# Patient Record
Sex: FEMALE | Race: BLACK OR AFRICAN AMERICAN | NOT HISPANIC OR LATINO | Employment: UNEMPLOYED | URBAN - METROPOLITAN AREA
[De-identification: names, ages, dates, MRNs, and addresses within clinical notes are randomized per-mention and may not be internally consistent; named-entity substitution may affect disease eponyms.]

---

## 2017-01-08 ENCOUNTER — APPOINTMENT (OUTPATIENT)
Dept: CT IMAGING | Facility: CLINIC | Age: 22
End: 2017-01-08
Attending: PHYSICIAN ASSISTANT
Payer: COMMERCIAL

## 2017-01-08 ENCOUNTER — HOSPITAL ENCOUNTER (EMERGENCY)
Facility: CLINIC | Age: 22
Discharge: HOME | End: 2017-01-08
Attending: PHYSICIAN ASSISTANT | Admitting: PHYSICIAN ASSISTANT
Payer: COMMERCIAL

## 2017-01-08 VITALS
SYSTOLIC BLOOD PRESSURE: 108 MMHG | TEMPERATURE: 98.4 F | HEIGHT: 67 IN | OXYGEN SATURATION: 98 % | WEIGHT: 191 LBS | DIASTOLIC BLOOD PRESSURE: 78 MMHG | BODY MASS INDEX: 29.98 KG/M2 | RESPIRATION RATE: 16 BRPM

## 2017-01-08 DIAGNOSIS — S06.0X9A CONCUSSION, WITH LOSS OF CONSCIOUSNESS OF UNSPECIFIED DURATION, INITIAL ENCOUNTER: ICD-10-CM

## 2017-01-08 DIAGNOSIS — S09.90XA CLOSED HEAD INJURY, INITIAL ENCOUNTER: ICD-10-CM

## 2017-01-08 PROCEDURE — 99284 EMERGENCY DEPT VISIT MOD MDM: CPT | Mod: 25

## 2017-01-08 PROCEDURE — 25000132 ZZH RX MED GY IP 250 OP 250 PS 637: Performed by: PHYSICIAN ASSISTANT

## 2017-01-08 PROCEDURE — 70450 CT HEAD/BRAIN W/O DYE: CPT

## 2017-01-08 RX ORDER — ACETAMINOPHEN 500 MG
1000 TABLET ORAL ONCE
Status: COMPLETED | OUTPATIENT
Start: 2017-01-08 | End: 2017-01-08

## 2017-01-08 RX ADMIN — ACETAMINOPHEN 1000 MG: 500 TABLET, FILM COATED ORAL at 20:48

## 2017-01-08 ASSESSMENT — ENCOUNTER SYMPTOMS
NAUSEA: 0
HEADACHES: 1
NUMBNESS: 0
VOMITING: 0

## 2017-01-08 NOTE — ED AVS SNAPSHOT
Emergency Department    [address] [facility] [address]    Phone:  [phone]    Fax:  [phone]                                       Sage Machado   MRN: [id number]    Department:   Emergency Department   Date of Visit:  1/8/2017           Patient Information     Date Of Birth          1995        Your diagnoses for this visit were:     Closed head injury, initial encounter     Concussion, with loss of consciousness of unspecified duration, initial encounter        You were seen by Shelby Felix PA-C.      Follow-up Information     Follow up with  Emergency Department.    Specialty:  EMERGENCY MEDICINE    Why:  If symptoms worsen    Contact information:    [address] [facility] [address] [phone]        Follow up with Yun Norwood MD In 3 days.    Specialty:  Family Practice    Contact information:    [facility]  [address]  [facility] [address] [phone]          Follow up with [facility].    Contact information:    For more information contact:  Concussion hotline: [phone]  To schedule an appointment: [phone]        Discharge Instructions       Discharge Instructions  Head Injury    You have been seen today for a head injury. You were checked for serious problems, like bleeding on the brain, but these problems cannot always be found right away.  Due to this risk, you should not be alone for 24 hours after your injury.  Follow up with your regular physician in 3 days.     Return to the Emergency Department if:    You are confused, have amnesia, or you are not acting right.    Your headache gets worse or you start to have a really bad headache even with your recommended treatment plan.    You vomit more than once.    You have a convulsion or seizure.    You have trouble walking.    You have weakness or paralysis in an arm or a leg.    You have blood or fluid coming from your ears or  nose.    You have new symptoms or anything that worries you.    Sleeping:  It is okay for you to sleep, but someone should wake you up as instructed by your doctor, and someone should check on you at your usual time to wake up.     Activity:    Do not drive for at least 24 hours.    Do not drive if you have dizzy spells or trouble concentrating, or remembering things.    Do not return to any contact sports until cleared by your regular doctor.     Follow-up:  It is very important that you make an appointment with your clinic and go to the appointment.  If you do not follow-up with your regular doctor, it may result in missing an important development which could result in permanent injury or disability and/or lasting pain.  If there is any problem keeping your appointment, call your doctor or return to the Emergency Department.    MORE INFORMATION:    Concussion:  A concussion is a minor head injury that may cause temporary problems with the way your brain works.  Some symptoms include:  confusion, amnesia, nausea and vomiting, dizziness, fatigue, memory or concentration problems, irritability and sleep problems.    CT Scans: Your evaluation today may have included a CT scan (CAT scan) to look for things like bleeding or a skull fracture (break).  CT scans involve radiation and too many CT scans can cause serious health problems like cancer, especially in children.  Because of this, your doctor may not have ordered a CT scan today if they think you are at low risk for a serious or life threatening problem.    If you were given a prescription for medicine here today, be sure to read all of the information (including the package insert) that comes with your prescription.  This will include important information about the medicine, its side effects, and any warnings that you need to know about.  The pharmacist who fills the prescription can provide more information and answer questions you may have about the medicine.   If you have questions or concerns that the pharmacist cannot address, please call or return to the Emergency Department.     Concussions  [facility]  What is a concussion?  A concussion is a mild traumatic brain injury (TBI) that occurs from a direct blow, bump or jolt to the head. It can also result from a blow to another part of the body when the force travels up to the head. A concussion can affect coordination, learning, memory and emotions.  Most concussions heal without issue. However, like any other injury, a brain injury should be given time to heal, which includes physical and mental rest (free from mental straining and visual stimuli like video gary and texting).  Signs and symptoms  Common signs and symptoms may include:    Altered mental status, including confusion, inappropriate emotions, agitation or abrupt change in personality    Amnesia/memory problems    Blurred vision, double vision, seeing stars or black spots    Dizziness, poor balance, or unsteadiness    Excessive or persistent headache    Excessive fatigue    Excessive sensitivity to light or loud noise    Feel  in a fog     Loss of consciousness or orientation    Poor balance/coordination    Ringing in ears    Vacant stare/glassy eyed    Vomiting  One of the most severe problems that can occur is bleeding inside the brain. If bleeding or swelling of the brain occurs, pressure in the skull rises and can cause brain injury. Bleeding may develop right after an injury or hours later, so monitoring symptoms is critical. This can be life threatening.    Why do a baseline computer test (ImPACT)?  Neurocognitive tests, such as ImPACT, provide information about how the brain is responding to injury. ImPACT has two components: a pre- and post-concussion test. The pretest scoring represents one s baseline (normal) brain function.  The test is then repeated post injury. Results of the pre- and post-concussion tests are compared  and a care plan is developed. If a pre-test was not completed prior to a concussion, a post-concussion test is still helpful in the assessment of brain function. An ImPACT test should be completed yearly due to the natural maturing of the brain.    What can I expect from the [facility] concussion program? [facility] providers will:    Facilitate completion of baseline tests    Manage concussion symptoms and make recommendations for return to previous activity level    Facilitate post-concussion testing    Refer to other health-care providers, as needed,  including neuropsychologists, neurologists and therapists who specialize in concussion management    When is it safe to return to activity?  A person should be free of symptoms and have returned  to normal sleeping and eating patterns as well as typical concentration levels before resuming activity. Once normal activities have resumed and there are no symptoms at rest, more demanding activities may be tried. Over time, activity will be increased as long as symptoms do not return. A gradual return to activities allows us the opportunity to assess brain healing.  Under no circumstances should anyone return to activity while experiencing concussion signs or symptoms.    For more information contact:  Concussion hotline: [phone]  To schedule an appointment: [phone]  Cost of computer testing (ImPACT)  Pre-concussion testing - $5  Post-concussion testing:    Option 1: $20 includes all post testing; excludes doctor review.    Option 2: [facility] doctor visit, including all post testing and review.          24 Hour Appointment Hotline       To make an appointment at any [facility] clinic, call [phone] ([phone]). If you don't have a family doctor or clinic, we will help you find one. [facility] clinics are conveniently located to serve the needs of you and your family.          ED Discharge Orders     CONCUSSION  REFERRAL        [facility] is referring you to the Concussion  service at [facility].      The  Representative will assist you in the coordination of your concussion care as prescribed by your physician.    The  Representative will contact you within one business day, or you may contact the  Representative at [phone].    Referral Options:  Non-Sports related concussion management    Coverage of these services are subject to the terms and limitations of your health insurance plan.  Please call member services at your health plan with any benefit or coverage questions.     If X-rays, CT or MRI's have been performed, please contact the facility where they were done, to arrange for  prior to your scheduled appointment.  Please bring this referral request to your appointment and present it to your specialist.                     Review of your medicines      Notice     You have not been prescribed any medications.            Procedures and tests performed during your visit     Head CT w/o contrast      Orders Needing Specimen Collection     None      Pending Results     Date and Time Order Name Status Description    1/8/2017 2043 Head CT w/o contrast Preliminary             Pending Culture Results     No orders found from 1/7/2017 to 1/9/2017.       Test Results from your hospital stay           1/8/2017  9:12 PM - Interface, Radiant Ib      Narrative     CT SCAN OF THE HEAD WITHOUT CONTRAST   1/8/2017 9:01 PM     HISTORY: Head injury with loss of consciousness.     TECHNIQUE:  Axial images of the head and coronal reformations without  IV contrast material.  Radiation dose for this scan was reduced using  automated exposure control, adjustment of the mA and/or kV according  to patient size, or iterative reconstruction technique.    COMPARISON: None.    FINDINGS:  The ventricles are normal in size, shape and configuration.   The brain  parenchyma and subarachnoid spaces are normal. There is no  evidence of intracranial hemorrhage, mass, acute infarct or anomaly.     The visualized portions of the sinuses and mastoids appear normal.  There is no evidence of trauma.        Impression     IMPRESSION: Normal CT scan of the head.                     Clinical Quality Measure: Blood Pressure Screening     Your blood pressure was checked while you were in the emergency department today. The last reading we obtained was  BP: 103/88 mmHg . Please read the guidelines below about what these numbers mean and what you should do about them.  If your systolic blood pressure (the top number) is less than 120 and your diastolic blood pressure (the bottom number) is less than 80, then your blood pressure is normal. There is nothing more that you need to do about it.  If your systolic blood pressure (the top number) is 120-139 or your diastolic blood pressure (the bottom number) is 80-89, your blood pressure may be higher than it should be. You should have your blood pressure rechecked within a year by a primary care provider.  If your systolic blood pressure (the top number) is 140 or greater or your diastolic blood pressure (the bottom number) is 90 or greater, you may have high blood pressure. High blood pressure is treatable, but if left untreated over time it can put you at risk for heart attack, stroke, or kidney failure. You should have your blood pressure rechecked by a primary care provider within the next 4 weeks.  If your provider in the emergency department today gave you specific instructions to follow-up with your doctor or provider even sooner than that, you should follow that instruction and not wait for up to 4 weeks for your follow-up visit.        Thank you for choosing [facility]       Thank you for choosing [facility] for your care. Our goal is always to provide you with excellent care. Hearing back from our patients is one way we can continue to  "improve our services. Please take a few minutes to complete the written survey that you may receive in the mail after you visit with us. Thank you!        [email or web address] Information     [email or web address] lets you send messages to your doctor, view your test results, renew your prescriptions, schedule appointments and more. To sign up, go to [email or web address] . Click on \"Log in\" on the left side of the screen, which will take you to the Welcome page. Then click on \"Sign up Now\" on the right side of the page.     You will be asked to enter the access code listed below, as well as some personal information. Please follow the directions to create your username and password.     Your access code is: [email or web address]  Expires: 2017  9:23 PM     Your access code will  in 90 days. If you need help or a new code, please call your [facility] clinic or [phone].        Care EveryWhere ID     This is your Care EveryWhere ID. This could be used by other organizations to access your [facility] medical records  [id number]        After Visit Summary       This is your record. Keep this with you and show to your community pharmacist(s) and doctor(s) at your next visit.                  "

## 2017-01-08 NOTE — ED AVS SNAPSHOT
Emergency Department    [address] [facility] [address]    Phone:  [phone]    Fax:  [phone]                                       Sgae Machado   MRN: [id number]    Department:   Emergency Department   Date of Visit:  1/8/2017           After Visit Summary Signature Page     I have received my discharge instructions, and my questions have been answered. I have discussed any challenges I see with this plan with the nurse or doctor.    ..........................................................................................................................................  Patient/Patient Representative Signature      ..........................................................................................................................................  Patient Representative Print Name and Relationship to Patient    ..................................................               ................................................  Date                                            Time    ..........................................................................................................................................  Reviewed by Signature/Title    ...................................................              ..............................................  Date                                                            Time

## 2017-01-09 ASSESSMENT — ENCOUNTER SYMPTOMS
ABDOMINAL PAIN: 0
NECK PAIN: 0
BACK PAIN: 0

## 2017-01-09 NOTE — ED NOTES
Bed: ED22  Expected date: 1/8/17  Expected time: 8:29 PM  Means of arrival: Ambulance  Comments:  [facility]-23yo head injury(green)

## 2017-01-09 NOTE — DISCHARGE INSTRUCTIONS
Discharge Instructions  Head Injury    You have been seen today for a head injury. You were checked for serious problems, like bleeding on the brain, but these problems cannot always be found right away.  Due to this risk, you should not be alone for 24 hours after your injury.  Follow up with your regular physician in 3 days.     Return to the Emergency Department if:    You are confused, have amnesia, or you are not acting right.    Your headache gets worse or you start to have a really bad headache even with your recommended treatment plan.    You vomit more than once.    You have a convulsion or seizure.    You have trouble walking.    You have weakness or paralysis in an arm or a leg.    You have blood or fluid coming from your ears or nose.    You have new symptoms or anything that worries you.    Sleeping:  It is okay for you to sleep, but someone should wake you up as instructed by your doctor, and someone should check on you at your usual time to wake up.     Activity:    Do not drive for at least 24 hours.    Do not drive if you have dizzy spells or trouble concentrating, or remembering things.    Do not return to any contact sports until cleared by your regular doctor.     Follow-up:  It is very important that you make an appointment with your clinic and go to the appointment.  If you do not follow-up with your regular doctor, it may result in missing an important development which could result in permanent injury or disability and/or lasting pain.  If there is any problem keeping your appointment, call your doctor or return to the Emergency Department.    MORE INFORMATION:    Concussion:  A concussion is a minor head injury that may cause temporary problems with the way your brain works.  Some symptoms include:  confusion, amnesia, nausea and vomiting, dizziness, fatigue, memory or concentration problems, irritability and sleep problems.    CT Scans: Your evaluation today may have included a CT scan (CAT  scan) to look for things like bleeding or a skull fracture (break).  CT scans involve radiation and too many CT scans can cause serious health problems like cancer, especially in children.  Because of this, your doctor may not have ordered a CT scan today if they think you are at low risk for a serious or life threatening problem.    If you were given a prescription for medicine here today, be sure to read all of the information (including the package insert) that comes with your prescription.  This will include important information about the medicine, its side effects, and any warnings that you need to know about.  The pharmacist who fills the prescription can provide more information and answer questions you may have about the medicine.  If you have questions or concerns that the pharmacist cannot address, please call or return to the Emergency Department.     Concussions  [facility]  What is a concussion?  A concussion is a mild traumatic brain injury (TBI) that occurs from a direct blow, bump or jolt to the head. It can also result from a blow to another part of the body when the force travels up to the head. A concussion can affect coordination, learning, memory and emotions.  Most concussions heal without issue. However, like any other injury, a brain injury should be given time to heal, which includes physical and mental rest (free from mental straining and visual stimuli like video gary and texting).  Signs and symptoms  Common signs and symptoms may include:    Altered mental status, including confusion, inappropriate emotions, agitation or abrupt change in personality    Amnesia/memory problems    Blurred vision, double vision, seeing stars or black spots    Dizziness, poor balance, or unsteadiness    Excessive or persistent headache    Excessive fatigue    Excessive sensitivity to light or loud noise    Feel  in a fog     Loss of consciousness or orientation    Poor  balance/coordination    Ringing in ears    Vacant stare/glassy eyed    Vomiting  One of the most severe problems that can occur is bleeding inside the brain. If bleeding or swelling of the brain occurs, pressure in the skull rises and can cause brain injury. Bleeding may develop right after an injury or hours later, so monitoring symptoms is critical. This can be life threatening.    Why do a baseline computer test (ImPACT)?  Neurocognitive tests, such as ImPACT, provide information about how the brain is responding to injury. ImPACT has two components: a pre- and post-concussion test. The pretest scoring represents one s baseline (normal) brain function.  The test is then repeated post injury. Results of the pre- and post-concussion tests are compared and a care plan is developed. If a pre-test was not completed prior to a concussion, a post-concussion test is still helpful in the assessment of brain function. An ImPACT test should be completed yearly due to the natural maturing of the brain.    What can I expect from the [facility] concussion program? [facility] providers will:    Facilitate completion of baseline tests    Manage concussion symptoms and make recommendations for return to previous activity level    Facilitate post-concussion testing    Refer to other health-care providers, as needed,  including neuropsychologists, neurologists and therapists who specialize in concussion management    When is it safe to return to activity?  A person should be free of symptoms and have returned  to normal sleeping and eating patterns as well as typical concentration levels before resuming activity. Once normal activities have resumed and there are no symptoms at rest, more demanding activities may be tried. Over time, activity will be increased as long as symptoms do not return. A gradual return to activities allows us the opportunity to assess brain healing.  Under no circumstances  should anyone return to activity while experiencing concussion signs or symptoms.    For more information contact:  Concussion hotline: [phone]  To schedule an appointment: [phone]  Cost of computer testing (ImPACT)  Pre-concussion testing - $5  Post-concussion testing:    Option 1: $20 includes all post testing; excludes doctor review.    Option 2: [facility] doctor visit, including all post testing and review.

## 2017-01-09 NOTE — ED PROVIDER NOTES
"  History     Chief Complaint:  Head injury    HPI   Sage Machado is a healthy 22 year old pregnant (~ 11 weeks pregnant) female who presents for evaluation of a head injury. The patient states that last night a DVD player landed on the back of her head. She is not sure if she lost consciousness or not, but states that she felt woozy for ~1 minute. Currently, she admits to headache and intermittent blurry vision. She denies neck pain or any other injuries. She has not taken any medications for pain. She denies nausea and vomiting. She denies any trauma to the abdomen, abdominal pain or vaginal bleeding. She denies anticoagulation.     Allergies:  No known drug allergies.     Medications:    The patient is currently on no regular medications.     Past Medical History:    HSV infection     Past Surgical History:    Induced  by D&C     Family History:    History reviewed. No pertinent family history.    Social History:  Marital Status: single  Presents to the ED with her daughter  Tobacco Use: 0.50 ppd  Alcohol Use: no  PCP: Yun Norwood     Review of Systems   Gastrointestinal: Negative for nausea, vomiting and abdominal pain.   Genitourinary: Negative for vaginal bleeding.   Musculoskeletal: Negative for back pain and neck pain.   Neurological: Positive for headaches. Negative for numbness.   All other systems reviewed and are negative.      Physical Exam     Patient Vitals for the past 24 hrs:   BP Temp Temp src Heart Rate Resp SpO2 Height Weight   17 2125 108/78 mmHg - - 89 16 98 % - -   17 2043 103/88 mmHg 98.4  F (36.9  C) Oral 108 18 99 % 1.702 m (5' 7\") 86.637 kg (191 lb)          Physical Exam  Nursing note and vitals reviewed.     GENERAL: Alert, mild distress.   HEENT:   Head: No facial asymmetry. No scalp hematomas or palpable bony step offs.   Eyes: Normal conjunctiva. No scleral icterus. PERRLA. EOMI. No nystagmus. No racoon's eyes.   Ears: Normal pinnae. Normal external " auditory canals. Normal tympanic membranes. No hemotympanum bilaterally. No Espinoza's signs.   Nose: No deformity. No nasal drainage.   Throat: MMM. No oropharyngeal erythema, edema, or exudate.  NECK: Supple, normal ROM. No midline tenderness over cervical spine.   CARDIAC: Normal rate and regular rhythm. Normal heart sounds. No murmurs, rubs, or gallops appreciated. Intact distal pulses.   PULMONARY: CTA bilaterally. Normal breath sounds. No wheezing, crackles, or rhonchi appreciated.    ABDOMEN: Soft, non-tender, non-distended. No rebound or guarding.   NEURO: Alert and oriented. GCS 15. Normal speech. CN II-XII intact. Sensation intact throughout. Normal strength of bilateral upper and lower extremities.   MUSCULOSKELETAL: Normal range of motion. No peripheral edema. No significant midline tenderness over thoracic, lumbar or sacral spine.   SKIN: Skin is warm and dry. No rashes. No pallor or jaundice.   PSYCH: Normal affect and mood.       Emergency Department Course     Imaging:  Head CT w/o contrast  Normal CT scan of the head.  Preliminary radiology read.      Radiographic findings were communicated with the patient who voiced understanding of the findings.    Interventions:  (2048) Tylenol, 1,000 mg, PO    Emergency Department Course:  Nursing notes and vitals reviewed.  I performed an exam of the patient as documented above. GCS 15.    The patient was sent for a head CT while in the emergency department, findings above.     (2120) I updated the patient on the CT results.     Findings and plan explained to the patient. Patient discharged home with instructions regarding supportive care, medications, and reasons to return. The importance of close follow-up was reviewed.      Impression & Plan      Medical Decision Making:  Sage Machado is a 22 year old female who is approximately 11 weeks pregnant who presents with concern for headache following a head injury yesterday where a DVD fell onto her head. It  sounds like she may have lost consciousness for a brief period. Since that time, she has symptoms of a headache prompting her arrival here. Here she has a completely normal neurological exam, however, given the potential brief loss of consciousness, CT was obtained. It was negative for any acute intracranial hemorrhage or skull fracture. No other acute injuries were noted on exam. Signs and symptoms are consistent with a concussion at this time. I reviewed the importance of brain rest and tylenol for pain. A referral for a concussion program was provided. In regards to her pregnancy, no abdominal pain or vaginal bleeding warranting further work-up. Reviewed reasons to return to ED, including worsening symptoms, focal weakness/numbness, vomiting, or any new concerns. The patient was in agreement with plan and discharged in satisfactory condition with all questions answered.        Diagnosis:    ICD-10-CM    1. Closed head injury, initial encounter S09.90XA CONCUSSION  REFERRAL   2. Concussion, with loss of consciousness of unspecified duration, initial encounter S06.0X9A CONCUSSION  REFERRAL       Disposition:  Discharge to home    INikhil, am serving as a scribe on 1/8/2017 at 8:40 PM to personally document services performed by Shelby Felix PA-C based on my observations and the provider's statements to me.     1/8/2017    EMERGENCY DEPARTMENT        Shelby Felix PA-C  01/09/17 [id number]

## 2017-07-24 ENCOUNTER — OFFICE VISIT (OUTPATIENT)
Dept: FAMILY MEDICINE | Facility: CLINIC | Age: 22
End: 2017-07-24
Payer: COMMERCIAL

## 2017-07-24 VITALS
WEIGHT: 178 LBS | DIASTOLIC BLOOD PRESSURE: 68 MMHG | SYSTOLIC BLOOD PRESSURE: 102 MMHG | TEMPERATURE: 98.8 F | RESPIRATION RATE: 16 BRPM | BODY MASS INDEX: 27.88 KG/M2 | OXYGEN SATURATION: 99 % | HEART RATE: 125 BPM

## 2017-07-24 DIAGNOSIS — R39.9 UTI SYMPTOMS: Primary | ICD-10-CM

## 2017-07-24 DIAGNOSIS — Z32.00 POSSIBLE PREGNANCY, NOT CONFIRMED: ICD-10-CM

## 2017-07-24 LAB
ALBUMIN UR-MCNC: NEGATIVE MG/DL
APPEARANCE UR: CLEAR
BETA HCG QUAL IFA URINE: POSITIVE
BILIRUB UR QL STRIP: NEGATIVE
COLOR UR AUTO: YELLOW
GLUCOSE UR STRIP-MCNC: NEGATIVE MG/DL
HGB UR QL STRIP: NEGATIVE
KETONES UR STRIP-MCNC: ABNORMAL MG/DL
LEUKOCYTE ESTERASE UR QL STRIP: NEGATIVE
Lab: NORMAL
MICRO REPORT STATUS: NORMAL
NITRATE UR QL: NEGATIVE
PH UR STRIP: 7 PH (ref 5–7)
SP GR UR STRIP: 1.02 (ref 1–1.03)
SPECIMEN SOURCE: NORMAL
URN SPEC COLLECT METH UR: ABNORMAL
UROBILINOGEN UR STRIP-ACNC: 0.2 EU/DL (ref 0.2–1)
WET PREP SPEC: NORMAL

## 2017-07-24 PROCEDURE — 84703 CHORIONIC GONADOTROPIN ASSAY: CPT | Performed by: FAMILY MEDICINE

## 2017-07-24 PROCEDURE — 99214 OFFICE O/P EST MOD 30 MIN: CPT | Performed by: FAMILY MEDICINE

## 2017-07-24 PROCEDURE — 81003 URINALYSIS AUTO W/O SCOPE: CPT | Performed by: FAMILY MEDICINE

## 2017-07-24 PROCEDURE — 87210 SMEAR WET MOUNT SALINE/INK: CPT | Performed by: FAMILY MEDICINE

## 2017-07-24 NOTE — MR AVS SNAPSHOT
"              After Visit Summary   2017    Sage Machado    MRN: [id number]           Patient Information     Date Of Birth          1995        Visit Information        Provider Department      2017 4:20 PM Yun Norwood MD [facility]        Today's Diagnoses     UTI symptoms    -  1    Possible pregnancy, not confirmed           Follow-ups after your visit        Who to contact     If you have questions or need follow up information about today's clinic visit or your schedule please contact [facility] directly at [phone].  Normal or non-critical lab and imaging results will be communicated to you by [employer], letter or phone within 4 business days after the clinic has received the results. If you do not hear from us within 7 days, please contact the clinic through [employer] or phone. If you have a critical or abnormal lab result, we will notify you by phone as soon as possible.  Submit refill requests through [employer] or call your pharmacy and they will forward the refill request to us. Please allow 3 business days for your refill to be completed.          Additional Information About Your Visit        MyChart Information     [employer] lets you send messages to your doctor, view your test results, renew your prescriptions, schedule appointments and more. To sign up, go to [email or web address] . Click on \"Log in\" on the left side of the screen, which will take you to the Welcome page. Then click on \"Sign up Now\" on the right side of the page.     You will be asked to enter the access code listed below, as well as some personal information. Please follow the directions to create your username and password.     Your access code is: [email or web address]  Expires: 10/28/2017 11:47 PM     Your access code will  in 90 days. If you need help or a new code, please call your [facility] or [phone].        Care EveryWhere ID     This is your Care EveryWhere ID. " This could be used by other organizations to access your [facility] medical records  [id number]        Your Vitals Were     Pulse Temperature Respirations Last Period Pulse Oximetry Breastfeeding?    125 98.8  F (37.1  C) (Tympanic) 16 06/24/2016 (Approximate) 99% Unknown    BMI (Body Mass Index)                   27.88 kg/m2            Blood Pressure from Last 3 Encounters:   07/24/17 102/68   01/08/17 108/78   12/10/16 96/57    Weight from Last 3 Encounters:   07/24/17 178 lb (80.7 kg)   01/08/17 191 lb (86.6 kg)   12/09/16 180 lb (81.6 kg)              We Performed the Following     Beta HCG qual IFA urine     UA reflex to Microscopic and Culture     Wet prep        Primary Care Provider Office Phone # Fax #    Deqa Joann Norwood [phone]       [facility] [address]  [facility] [address]        Equal Access to Services     BLADE CHOWDARY AH: Hadii aad ku hadasho Soomaali, waaxda luqadaha, qaybta kaalmada adeegyada, waxay gilbertin hayalexein rosa winslow . So [facility] [phone].    ATENCIÓN: Si habla español, tiene a ponce disposición servicios gratuitos de asistencia lingüística. Llame al [phone].    We comply with applicable federal civil rights laws and [place] laws. We do not discriminate on the basis of race, color, national origin, age, disability sex, sexual orientation or gender identity.            Thank you!     Thank you for choosing [facility]  for your care. Our goal is always to provide you with excellent care. Hearing back from our patients is one way we can continue to improve our services. Please take a few minutes to complete the written survey that you may receive in the mail after your visit with us. Thank you!             Your Updated Medication List - Protect others around you: Learn how to safely use, store and throw away your medicines at www.disposemymeds.org.      Notice  As of 7/24/2017 11:59 PM    You have not been prescribed any  medications.

## 2017-07-24 NOTE — PROGRESS NOTES
SUBJECTIVE:                                                    Sage Machado is a 22 year old female who presents to clinic today for the following health issues:      Vaginal Symptoms      Duration: one week     Description  White vaginal discharge     Accompanying signs and symptoms (fever/dysuria/abdominal or back pain): intermittent mild lower abdominal pain, right upper back pain      History  Sexually active: yes  Possibility of pregnancy: No  Recent antibiotic use: YES- couple of weeks ago     Precipitating or alleviating factors: None    Therapies tried and outcome: none      LMP 06/25/17.  Pt feels tired and exhausted. She endorses nausea. No vomiting.   Pt is not on oral contraceptives. Pt had intercourse one time without condoms.   Abdominal pain is only brought on by drinking alcohol.   She drinks daily and thinks she has a drinking problem.   She is also smoking.   No vaginal bleeding since menstrual cycle.       Problem list and histories reviewed & adjusted, as indicated.  Additional history: as documented    Labs reviewed in EPIC    Reviewed and updated as needed this visit by clinical staffAllergies       Reviewed and updated as needed this visit by Provider         ROS:  Constitutional, HEENT, cardiovascular, pulmonary, gi and gu systems are negative, except as otherwise noted.      OBJECTIVE:   /68 (BP Location: Right arm, Patient Position: Chair, Cuff Size: Adult Regular)  Pulse 125  Temp 98.8  F (37.1  C) (Tympanic)  Resp 16  Wt 178 lb (80.7 kg)  LMP 06/24/2016 (Approximate)  SpO2 99%  Breastfeeding? Unknown  BMI 27.88 kg/m2  Body mass index is 27.88 kg/(m^2).  GENERAL: healthy, alert and no distress  EYES: Eyes grossly normal to inspection  HENT:  nose and mouth without ulcers or lesions  ABDOMEN: soft, nontender, no hepatosplenomegaly, no masses     Diagnostic Test Results:  Results for orders placed or performed in visit on 07/24/17 (from the past 24 hour(s))   Wet prep    Result Value Ref Range    Specimen Description Vagina     Special Requests Vagina     Wet Prep       No Trichomonas seen  No clue cells seen  No yeast seen      Micro Report Status FINAL 2017    UA reflex to Microscopic and Culture   Result Value Ref Range    Color Urine Yellow     Appearance Urine Clear     Glucose Urine Negative NEG mg/dL    Bilirubin Urine Negative NEG    Ketones Urine Trace (A) NEG mg/dL    Specific Gravity Urine 1.020 1.003 - 1.035    Blood Urine Negative NEG    pH Urine 7.0 5.0 - 7.0 pH    Protein Albumin Urine Negative NEG mg/dL    Urobilinogen Urine 0.2 0.2 - 1.0 EU/dL    Nitrite Urine Negative NEG    Leukocyte Esterase Urine Negative NEG    Source Midstream Urine    Beta HCG qual IFA urine   Result Value Ref Range    Beta HCG Qual IFA Urine Positive (A) NEG       ASSESSMENT/PLAN:     1. UTI symptoms  - Wet prep negative   - UA not c/w UTI    2. Possible pregnancy, not confirmed  - Beta HCG qual IFA urine positive   - Pt was very surprised that UPT was positive. This was not a planned pregnancy and she is unsure if she wants to continue pregnancy. She will discuss with father of baby. For now, I recommended for pt to avoid smoking, alcohol and to take prenatal vitamins. If she decides to not continue pregnancy, [facility] .         Yun Norwood MD  [facility]

## 2017-07-24 NOTE — NURSING NOTE
"Chief Complaint   Patient presents with     Vaginal Problem     possible yeast      Fatigue       Initial /68 (BP Location: Right arm, Patient Position: Chair, Cuff Size: Adult Regular)  Pulse 125  Temp 98.8  F (37.1  C) (Tympanic)  Resp 16  Wt 178 lb (80.7 kg)  LMP 06/24/2016 (Approximate)  SpO2 99%  Breastfeeding? Unknown  BMI 27.88 kg/m2 Estimated body mass index is 27.88 kg/(m^2) as calculated from the following:    Height as of 1/8/17: 5' 7\" (1.702 m).    Weight as of this encounter: 178 lb (80.7 kg).  Medication Reconciliation: complete     Shaina Collier MA      "

## 2017-08-19 ENCOUNTER — HEALTH MAINTENANCE LETTER (OUTPATIENT)
Age: 22
End: 2017-08-19

## 2019-03-02 ENCOUNTER — TELEPHONE (OUTPATIENT)
Dept: FAMILY MEDICINE | Facility: CLINIC | Age: 24
End: 2019-03-02

## 2019-03-02 NOTE — TELEPHONE ENCOUNTER
Patient calling with questions regarding STD testing that she had at a [facility] on 2/19/19 and wanting to get a pap smear today because she thinks she has cervical cancer.  Advised that she would need to be seen with a PCP or OBGYN to discuss pap smear testing.  She states she was treated for BV and is having discharge.  Advised that she can be seen in UC for the discharge to diagnose this but she does not need to go to the UC or ER to get a pap smear only.  She states she has an appt on 3/8/19 with her PCP but wants to be seen sooner.  Pt kept going back and forth about getting a pap smear but having vaginal discharge and wanting STD testing.  Again recommended she be seen in UC for STD/vaginal discharge but she needs to follow up with PCP regarding the pap smear/HPV testing.    Giovani Travis RN, BSN

## 2019-03-19 ENCOUNTER — HOSPITAL ENCOUNTER (EMERGENCY)
Facility: CLINIC | Age: 24
Discharge: HOME | End: 2019-03-19
Attending: EMERGENCY MEDICINE | Admitting: EMERGENCY MEDICINE
Payer: COMMERCIAL

## 2019-03-19 VITALS
SYSTOLIC BLOOD PRESSURE: 134 MMHG | TEMPERATURE: 99.4 F | DIASTOLIC BLOOD PRESSURE: 93 MMHG | RESPIRATION RATE: 18 BRPM | OXYGEN SATURATION: 99 %

## 2019-03-19 DIAGNOSIS — R20.0 NUMBNESS: ICD-10-CM

## 2019-03-19 DIAGNOSIS — N92.1 PROLONGED MENSTRUAL CYCLE: ICD-10-CM

## 2019-03-19 DIAGNOSIS — R51.9 INTERMITTENT HEADACHE: ICD-10-CM

## 2019-03-19 PROCEDURE — 99282 EMERGENCY DEPT VISIT SF MDM: CPT

## 2019-03-19 ASSESSMENT — ENCOUNTER SYMPTOMS
NUMBNESS: 1
HEADACHES: 1

## 2019-03-19 NOTE — ED TRIAGE NOTES
Pt presents with L arm pain. Pt had Nexplanon implant for birth control placed 2/19. States 2 days ago site became numb, states no numbness in hand. Also c/o of rash in area which has improved. +headaches, 2 weeks of menstrual bleeding. Doctor told her to come to ED to get it removed. ABCs intact.

## 2019-03-20 NOTE — ED PROVIDER NOTES
History     Chief Complaint:  Nexplanon Removal    HPI   Sage Machado is a 24 year old female who presents to the emergency department today for nexplanon removal. The patient reports that she underwent nexplanon implantation on 18 by [facility] in [place] by her primary care provider. Since then she reports an improving itchy rash around the implant, headaches, and two weeks of menstrual bleeding. [place] days ago the site became numb. These things have prompted her to desire removal of the implant. Her primary care provider therefore referred her to the ED for removal. Here the patient denies any pain.     Allergies:  No Known Drug Allergies     Medications:    Nexplanon    Past Medical History:    Herpes simplex virus    Past Surgical History:    Induced     Social History:  The patient was accompanied to the ED by her child.  Smoking Status: positive   Packs/day: 0.5   Types: Cigarettes  Smokeless Tobacco: Never Used  Alcohol Use: Negative  Drug Use: Negative  PCP: Yun Norwood  Marital Status:  Single      Review of Systems   Genitourinary: Positive for vaginal bleeding.   Skin: Positive for rash (itch).   Neurological: Positive for numbness and headaches.   All other systems reviewed and are negative.      Physical Exam     Patient Vitals for the past 24 hrs:   BP Temp Temp src Heart Rate Resp SpO2   19 1759 (!) 134/93 99.4  F (37.4  C) Temporal 101 18 99 %     Physical Exam  General: Adult female sitting upright, talking on the phone  Eyes: PERRL, Conjunctive within normal limits  ENT: Moist mucous membranes, oropharynx clear.   Neck: Nontender. Normal AROM.  CV: Normal S1S2, no murmur, rub or gallop. Regular rate and rhythm  Resp: Clear to auscultation bilaterally, no wheezes, rales or rhonchi. Normal respiratory effort.  GI: Abdomen is soft, nontender and nondistended. No palpable masses. No rebound or guarding.  MSK: No edema. Nontender. Palpable implant left medial upper arm.  Normal active range of motion.  Skin: Warm and dry. No rashes or lesions or ecchymoses on visible skin.  Neuro: Alert and oriented. Sensation intact to light touch over all dermatomes of the LUE aside froma small patch over the left lateral upper arm. 5/5 strength diffusely LUE. Responds appropriately to all questions and commands. No focal findings appreciated. Normal muscle tone.  Psych: Normal mood and affect. Pleasant.    Emergency Department Course     Emergency Department Course:    1922 Nursing notes and vitals reviewed.    1955 I performed an exam of the patient as documented above.     2015 I personally answered all related questions prior to discharge.    Impression & Plan      Medical Decision Making:  Sage Machado is a 24 year old female approximately one month status post implant nexplanon placement in her left upper extremity who presents to the emergency department today with a desire to have it remove. Since having it place she has had intermittent numbness, some pain, intermittent headaches, and ongoing vaginal bleeding. She currently only has a patch of numbness on the left lateral shoulder with no other neurovascular findings on examination. She has no evidence of infection. She was denying any symptoms relating to the ongoing vaginal bleeding. No indication for labs given her otherwise normal appearance without clinical signs of anemia. She is consulted that I do not remove nexplanon's in the ED, and she should follow up with the provider who placed it to have it removed. She felt comfortable with this plan. All questions were answered prior to discharge.     Diagnosis:    ICD-10-CM    1. Intermittent headache R51    2. Numbness R20.0    3. Prolonged menstrual cycle N92.1      Disposition:   The patient is discharged to home.    Discharge Medications:  No discharge medications.    Scribe Disclosure:  Noa COKER, am serving as a scribe at 7:32 PM on 3/19/2019 to document services personally  performed by Herlinda Friedman MD based on my observations and the provider's statements to me.     [facility] EMERGENCY DEPARTMENT       Herlinda Friedman MD  03/21/19 0053

## 2019-03-20 NOTE — DISCHARGE INSTRUCTIONS
Discharge Instructions  Headache    You were seen today for a headache. Headaches may be caused by many different things such as muscle tension, sinus inflammation, anxiety and stress, having too little sleep, too much alcohol, some medical conditions or injury. You may have a migraine, which is caused by changes in the blood vessels in your head.  At this time your provider does not find that your headache is a sign of anything dangerous or life-threatening.  However, sometimes the signs of serious illness do not show up right away.      Generally, every Emergency Department visit should have a follow-up clinic visit with either a primary or a specialty clinic/provider. Please follow-up as instructed by your emergency provider today.    Return to the Emergency Department if:  You get a new fever of 100.4 F or higher.  Your headache gets much worse.  You get a stiff neck with your headache.  You get a new headache that is significantly different or worse than headaches you have had before.  You are vomiting (throwing up) and cannot keep food or water down.  You have blurry or double vision or other problems with your eyes.  You have a new weakness on one side of your body.  You have difficulty with balance which is new.  You or your family thinks you are confused.  You have a seizure.    What can I do to help myself?  Pain medications - You may take a pain medication such as Tylenol  (acetaminophen), Advil , Motrin  (ibuprofen) or Aleve  (naproxen).  Take a pain reliever as soon as you notice symptoms.  Starting medications as soon as you start to have symptoms may lessen the amount of pain you have.  Relaxing in a quiet, dark room may help.  Get enough sleep and eat meals regularly.  You may need to watch for certain foods or other things which may trigger your headaches.  Keeping a journal of your headaches and possible triggers may help you and your primary provider to identify things which you should avoid which  may be causing your headaches.  If you were given a prescription for medicine here today, be sure to read all of the information (including the package insert) that comes with your prescription.  This will include important information about the medicine, its side effects, and any warnings that you need to know about.  The pharmacist who fills the prescription can provide more information and answer questions you may have about the medicine.  If you have questions or concerns that the pharmacist cannot address, please call or return to the Emergency Department.   Remember that you can always come back to the Emergency Department if you are not able to see your regular provider in the amount of time listed above, if you get any new symptoms, or if there is anything that worries you.     It is possible all of your symptoms are due to hormonal change and positioning of the implant.

## 2024-03-12 ENCOUNTER — TRANSFERRED RECORDS (OUTPATIENT)
Dept: HEALTH INFORMATION MANAGEMENT | Facility: CLINIC | Age: 29
End: 2024-03-12
Payer: COMMERCIAL

## 2024-03-12 LAB — PHQ9 SCORE: 11

## 2024-03-13 ENCOUNTER — MEDICAL CORRESPONDENCE (OUTPATIENT)
Dept: HEALTH INFORMATION MANAGEMENT | Facility: CLINIC | Age: 29
End: 2024-03-13
Payer: COMMERCIAL

## 2024-03-15 ENCOUNTER — TRANSCRIBE ORDERS (OUTPATIENT)
Dept: OTHER | Age: 29
End: 2024-03-15

## 2024-03-15 DIAGNOSIS — D50.0 IRON DEFICIENCY ANEMIA DUE TO CHRONIC BLOOD LOSS: Primary | ICD-10-CM

## 2024-03-18 ENCOUNTER — PATIENT OUTREACH (OUTPATIENT)
Dept: ONCOLOGY | Facility: CLINIC | Age: 29
End: 2024-03-18
Payer: COMMERCIAL

## 2024-03-18 NOTE — TELEPHONE ENCOUNTER
RECORDS STATUS - ALL OTHER DIAGNOSIS      RECORDS RECEIVED FROM: [facility]   DATE RECEIVED:    NOTES STATUS DETAILS   OFFICE NOTE from referring provider External: [facility] 3/12/24: Leyla Arce PA-C   MEDICATION LIST [facility]- [facility]    LABS     ANYTHING RELATED TO DIAGNOSIS [facility]- [facility] Most recent 5/2/24

## 2024-03-18 NOTE — PROGRESS NOTES
Hematology referral reviewed for [facility], see below.    Referral reason: referral received by outside PCP for CARLOS EDUARDO with inability to tolerate oral iron    Clinical question entered by referring provider or through order transcription: CARLOS EDUARDO    Referral received via: Fax referral from [facility] [employer]    Current abnormal labs: Available in [facility]    Outreach: Call not placed to patient regarding referral.    Plan: Triage instructions updated and sent to [facility] for completion.

## 2024-03-18 NOTE — LETTER
March 21, 2024       TO: Sage VIKI Jeannette  [address]  [facility] [address]         Dear MsKadeem Jeannette,    We missed you at your appointment on March 20, 2024 with hematology with [facility].  Your primary care provider had referred you to see hematology.     To help you reschedule your appointment, call [phone] option 5 then option 1     We are committed to providing you with exceptional care and service. It's important that our patients can get appointments when they need them, so we ask that you make every effort to consistently attend scheduled appointments and give us notice when you need to cancel an appointment.    If financial concerns have kept you from your appointment, we want to help. Please call the financial counselor at [phone] for assistance.      Sincerely,    [facility]

## 2024-03-20 ENCOUNTER — PRE VISIT (OUTPATIENT)
Dept: ONCOLOGY | Facility: HOSPITAL | Age: 29
End: 2024-03-20
Payer: COMMERCIAL

## 2024-04-14 ENCOUNTER — HEALTH MAINTENANCE LETTER (OUTPATIENT)
Age: 29
End: 2024-04-14

## 2024-08-06 ENCOUNTER — ONCOLOGY VISIT (OUTPATIENT)
Dept: ONCOLOGY | Facility: CLINIC | Age: 29
End: 2024-08-06
Attending: PHYSICIAN ASSISTANT
Payer: COMMERCIAL

## 2024-08-06 VITALS
OXYGEN SATURATION: 100 % | WEIGHT: 256.2 LBS | BODY MASS INDEX: 40.21 KG/M2 | HEIGHT: 67 IN | HEART RATE: 116 BPM | RESPIRATION RATE: 16 BRPM | DIASTOLIC BLOOD PRESSURE: 75 MMHG | SYSTOLIC BLOOD PRESSURE: 114 MMHG

## 2024-08-06 DIAGNOSIS — D50.0 IRON DEFICIENCY ANEMIA DUE TO CHRONIC BLOOD LOSS: Primary | ICD-10-CM

## 2024-08-06 DIAGNOSIS — N92.0 MENORRHAGIA WITH REGULAR CYCLE: ICD-10-CM

## 2024-08-06 LAB
FERRITIN SERPL-MCNC: 17 NG/ML (ref 6–175)
HGB BLD-MCNC: 11.2 G/DL (ref 11.7–15.7)
HOLD SPECIMEN: NORMAL
IRON BINDING CAPACITY (ROCHE): 362 UG/DL (ref 240–430)
IRON SATN MFR SERPL: 7 % (ref 15–46)
IRON SERPL-MCNC: 26 UG/DL (ref 37–145)

## 2024-08-06 PROCEDURE — 83550 IRON BINDING TEST: CPT | Performed by: INTERNAL MEDICINE

## 2024-08-06 PROCEDURE — 36415 COLL VENOUS BLD VENIPUNCTURE: CPT | Performed by: INTERNAL MEDICINE

## 2024-08-06 PROCEDURE — 85018 HEMOGLOBIN: CPT | Performed by: INTERNAL MEDICINE

## 2024-08-06 PROCEDURE — 82728 ASSAY OF FERRITIN: CPT | Performed by: INTERNAL MEDICINE

## 2024-08-06 PROCEDURE — 99204 OFFICE O/P NEW MOD 45 MIN: CPT | Performed by: INTERNAL MEDICINE

## 2024-08-06 PROCEDURE — G0463 HOSPITAL OUTPT CLINIC VISIT: HCPCS | Performed by: INTERNAL MEDICINE

## 2024-08-06 RX ORDER — HEPARIN SODIUM,PORCINE 10 UNIT/ML
5-20 VIAL (ML) INTRAVENOUS DAILY PRN
Status: CANCELLED | OUTPATIENT
Start: 2024-08-13

## 2024-08-06 RX ORDER — ACETAMINOPHEN 500 MG
1000 TABLET ORAL
COMMUNITY
Start: 2023-11-19

## 2024-08-06 RX ORDER — FERROUS GLUCONATE 324(38)MG
TABLET ORAL
COMMUNITY
Start: 2024-05-15

## 2024-08-06 RX ORDER — ALBUTEROL SULFATE 90 UG/1
1-2 AEROSOL, METERED RESPIRATORY (INHALATION)
Status: CANCELLED
Start: 2024-08-13

## 2024-08-06 RX ORDER — MEPERIDINE HYDROCHLORIDE 25 MG/ML
25 INJECTION INTRAMUSCULAR; INTRAVENOUS; SUBCUTANEOUS EVERY 30 MIN PRN
Status: CANCELLED | OUTPATIENT
Start: 2024-08-13

## 2024-08-06 RX ORDER — GUAIFENESIN 600 MG/1
600 TABLET, EXTENDED RELEASE ORAL
COMMUNITY
Start: 2024-07-30 | End: 2024-10-02

## 2024-08-06 RX ORDER — METHYLPREDNISOLONE SODIUM SUCCINATE 125 MG/2ML
125 INJECTION, POWDER, LYOPHILIZED, FOR SOLUTION INTRAMUSCULAR; INTRAVENOUS
Status: CANCELLED
Start: 2024-08-13

## 2024-08-06 RX ORDER — ALBUTEROL SULFATE 0.83 MG/ML
2.5 SOLUTION RESPIRATORY (INHALATION)
Status: CANCELLED | OUTPATIENT
Start: 2024-08-13

## 2024-08-06 RX ORDER — DIPHENHYDRAMINE HYDROCHLORIDE 50 MG/ML
50 INJECTION INTRAMUSCULAR; INTRAVENOUS
Status: CANCELLED
Start: 2024-08-13

## 2024-08-06 RX ORDER — GALCANEZUMAB 120 MG/ML
INJECTION, SOLUTION SUBCUTANEOUS
COMMUNITY
Start: 2024-07-09

## 2024-08-06 RX ORDER — FLUTICASONE PROPIONATE 50 MCG
2 SPRAY, SUSPENSION (ML) NASAL
COMMUNITY
Start: 2024-06-17

## 2024-08-06 RX ORDER — BUPROPION HYDROCHLORIDE 150 MG/1
1 TABLET, EXTENDED RELEASE ORAL DAILY
COMMUNITY
Start: 2024-07-22

## 2024-08-06 RX ORDER — ACYCLOVIR 400 MG/1
TABLET ORAL PRN
COMMUNITY

## 2024-08-06 RX ORDER — HYDROXYZINE HYDROCHLORIDE 25 MG/1
1 TABLET, FILM COATED ORAL AT BEDTIME
COMMUNITY
Start: 2024-03-27

## 2024-08-06 RX ORDER — IBUPROFEN 600 MG/1
600 TABLET, FILM COATED ORAL
COMMUNITY
Start: 2024-05-16

## 2024-08-06 RX ORDER — SERTRALINE HYDROCHLORIDE 25 MG/1
1 TABLET, FILM COATED ORAL DAILY
COMMUNITY
Start: 2024-07-22

## 2024-08-06 RX ORDER — HEPARIN SODIUM (PORCINE) LOCK FLUSH IV SOLN 100 UNIT/ML 100 UNIT/ML
5 SOLUTION INTRAVENOUS
Status: CANCELLED | OUTPATIENT
Start: 2024-08-13

## 2024-08-06 RX ORDER — EPINEPHRINE 1 MG/ML
0.3 INJECTION, SOLUTION INTRAMUSCULAR; SUBCUTANEOUS EVERY 5 MIN PRN
Status: CANCELLED | OUTPATIENT
Start: 2024-08-13

## 2024-08-06 ASSESSMENT — PAIN SCALES - GENERAL: PAINLEVEL: MILD PAIN (3)

## 2024-08-06 NOTE — NURSING NOTE
"Oncology Rooming Note    August 6, 2024 1:06 PM   Sage Machado is a 29 year old female who presents for:    Chief Complaint   Patient presents with    Oncology Clinic Visit     New patient     Initial Vitals: /75 (BP Location: Left arm)   Pulse 116   Resp 16   Ht 1.689 m (5' 6.5\")   Wt 116.2 kg (256 lb 3.2 oz)   LMP  (LMP Unknown)   SpO2 100%   BMI 40.73 kg/m   Estimated body mass index is 40.73 kg/m  as calculated from the following:    Height as of this encounter: 1.689 m (5' 6.5\").    Weight as of this encounter: 116.2 kg (256 lb 3.2 oz). Body surface area is 2.33 meters squared.  Mild Pain (3) Comment: Data Unavailable   No LMP recorded (lmp unknown). (Menstrual status: Amenorrhea).  Allergies reviewed: Yes  Medications reviewed: Yes    Medications: Medication refills not needed today.  Pharmacy name entered into [facility]: [id number] [place] [phone] [place] [id number]    Frailty Screening:   Is the patient here for a new oncology consult visit in cancer care? 2. No      Clinical concerns: New patient       Lisette Zayas LPN              "

## 2024-08-06 NOTE — LETTER
2024      Sage Machado  [address]  [facility] [address]      Dear Colleague,    Thank you for referring your patient, Sage Machado, to the [facility]. Please see a copy of my visit note below.    [facility] Hematology / Oncology  Initial Visit / Consultation Note Aug 6, 2024  Name: Sage Machado  :  1995  MRN:  [id number]    --------------------    Assessment / Plan:  Iron deficiency anemia.  Menorrhagia.    Over the course of our visit, Sage and I reviewed her iron deficiency anemia as it relates to clinical symptoms, lab abnormalities and potential causes.  Based upon the evaluation to date, the most likely cause for her iron deficiency is menorrhagia.  From a treatment perspective, I would recommend replacing w/ IV iron with plans to recheck iron stores as detailed below.  Control of her periods would be ideal to preventing recurrent CARLOS EDUARDO.  If her CARLOS EDUARDO persists despite control of menorrhagia, evaluation for GIB sources should be considered.    Plan:  Injectafer 750 mg IV x 2 doses.  Recheck CBC, ferritin 4-6 weeks later.    Thank you kindly for this consultation.  Carlos Chavez MD    --------------------    Interval History:  Sage presents for evaluation of iron deficiency anemia, self-referred.    Fatigue: Yes.  PICA: Yes: soap, ice, chalk.  Restless legs: No.  Hair, nail changes: No.    Diet: Well balanced.  Celiac / gluten intolerance: No.  Gastritis / h pylori: No.  Recent endoscopy: No.  Recent colonoscopy No.  Recent capsule endoscopy: No.  Prior upper abdominal surgery: None.    Gum bleeding: No.  Epistaxis: No.  Hemoptysis: No.  Hematemesis: No.  Melena, BRBPR: No.  Hematuria: No.  Phlebotomy, blood donation: No.  Hemodialysis: No.    NSAIDs: No.  Blood thinners: No.  H2s/PPIs: No.     For females:  Menstrual history: Menstrual cycles started at 15; heavy periods.  Pregnancy history: 4 children; required parenteral iron  "with several pregnancies.    No family hx of sickle cell or sickle cell trait.  No family hx of hemoglobinopathy.    Last period Feb / March; OCP.    --------------------    Review of Systems:  10 point ROS negative except for that above.    Past Medical / Surgical History:  Past Medical History:   Diagnosis Date     Herpes simplex virus infection      Past Surgical History:   Procedure Laterality Date     ZZC INDUCED ABORTN BY D&C  age 17     Patient Active Problem List   Diagnosis     GBS (group B streptococcus) infection     Iron deficiency anemia due to chronic blood loss     Family History:  Family History   Problem Relation Age of Onset     Diabetes Maternal Grandfather      Hypertension Paternal Grandmother      Hypertension Paternal Grandfather      Sleep Disorder Other        Social History:  Social History     Tobacco Use     Smoking status: Every Day     Current packs/day: 0.25     Types: Cigarettes     Smokeless tobacco: Never   Substance Use Topics     Alcohol use: No     Drug use: No     Medications / Allergies:  Reviewed in EMR.    --------------------    Physical Exam:  VS: /75 (BP Location: Left arm)   Pulse 116   Resp 16   Ht 1.689 m (5' 6.5\")   Wt 116.2 kg (256 lb 3.2 oz)   LMP  (LMP Unknown)   SpO2 100%   BMI 40.73 kg/m    GEN: Well appearing.    Labs / Imaging / Path:  Reviewed CBC, ferritin, iron panel.    Again, thank you for allowing me to participate in the care of your patient.        Sincerely,        Carlos Chavez MD  "

## 2024-08-09 NOTE — PATIENT INSTRUCTIONS
1) Injectafer x 2 doses (pending insurance PA).  2) KELVIN 4-6 weeks after completing IV iron w/ labs (CBC, ferritin).    Carlos Chavez MD.

## 2024-08-09 NOTE — PROGRESS NOTES
[facility] Hematology / Oncology  Initial Visit / Consultation Note Aug 6, 2024  Name: Sage Machado  :  1995  MRN:  [id number]    --------------------    Assessment / Plan:  Iron deficiency anemia.  Menorrhagia.    Over the course of our visit, Sage and I reviewed her iron deficiency anemia as it relates to clinical symptoms, lab abnormalities and potential causes.  Based upon the evaluation to date, the most likely cause for her iron deficiency is menorrhagia.  From a treatment perspective, I would recommend replacing w/ IV iron with plans to recheck iron stores as detailed below.  Control of her periods would be ideal to preventing recurrent CARLOS EDUARDO.  If her CARLOS EDUARDO persists despite control of menorrhagia, evaluation for GIB sources should be considered.    Plan:  Injectafer 750 mg IV x 2 doses.  Recheck CBC, ferritin 4-6 weeks later.    Thank you kindly for this consultation.  Carlos Chavez MD    --------------------    Interval History:  Sage presents for evaluation of iron deficiency anemia, self-referred.    Fatigue: Yes.  PICA: Yes: soap, ice, chalk.  Restless legs: No.  Hair, nail changes: No.    Diet: Well balanced.  Celiac / gluten intolerance: No.  Gastritis / h pylori: No.  Recent endoscopy: No.  Recent colonoscopy No.  Recent capsule endoscopy: No.  Prior upper abdominal surgery: None.    Gum bleeding: No.  Epistaxis: No.  Hemoptysis: No.  Hematemesis: No.  Melena, BRBPR: No.  Hematuria: No.  Phlebotomy, blood donation: No.  Hemodialysis: No.    NSAIDs: No.  Blood thinners: No.  H2s/PPIs: No.     For females:  Menstrual history: Menstrual cycles started at 15; heavy periods.  Pregnancy history: 4 children; required parenteral iron with several pregnancies.    No family hx of sickle cell or sickle cell trait.  No family hx of hemoglobinopathy.    Last period ; OCP.    --------------------    Review of Systems:  10 point ROS negative except for that above.    Past Medical  "/ Surgical History:  Past Medical History:   Diagnosis Date    Herpes simplex virus infection      Past Surgical History:   Procedure Laterality Date    ZZC INDUCED ABORTN BY D&C  age 17     Patient Active Problem List   Diagnosis    GBS (group B streptococcus) infection    Iron deficiency anemia due to chronic blood loss     Family History:  Family History   Problem Relation Age of Onset    Diabetes Maternal Grandfather     Hypertension Paternal Grandmother     Hypertension Paternal Grandfather     Sleep Disorder Other        Social History:  Social History     Tobacco Use    Smoking status: Every Day     Current packs/day: 0.25     Types: Cigarettes    Smokeless tobacco: Never   Substance Use Topics    Alcohol use: No    Drug use: No     Medications / Allergies:  Reviewed in EMR.    --------------------    Physical Exam:  VS: /75 (BP Location: Left arm)   Pulse 116   Resp 16   Ht 1.689 m (5' 6.5\")   Wt 116.2 kg (256 lb 3.2 oz)   LMP  (LMP Unknown)   SpO2 100%   BMI 40.73 kg/m    GEN: Well appearing.    Labs / Imaging / Path:  Reviewed CBC, ferritin, iron panel.  "

## 2024-10-02 ENCOUNTER — INFUSION THERAPY VISIT (OUTPATIENT)
Dept: INFUSION THERAPY | Facility: CLINIC | Age: 29
End: 2024-10-02
Attending: INTERNAL MEDICINE
Payer: COMMERCIAL

## 2024-10-02 VITALS
OXYGEN SATURATION: 100 % | WEIGHT: 268 LBS | TEMPERATURE: 97 F | BODY MASS INDEX: 42.61 KG/M2 | RESPIRATION RATE: 16 BRPM | DIASTOLIC BLOOD PRESSURE: 78 MMHG | SYSTOLIC BLOOD PRESSURE: 112 MMHG | HEART RATE: 118 BPM

## 2024-10-02 DIAGNOSIS — D50.0 IRON DEFICIENCY ANEMIA DUE TO CHRONIC BLOOD LOSS: Primary | ICD-10-CM

## 2024-10-02 PROCEDURE — 258N000003 HC RX IP 258 OP 636: Performed by: INTERNAL MEDICINE

## 2024-10-02 PROCEDURE — 99207 PR NO CHARGE LOS: CPT

## 2024-10-02 PROCEDURE — 96365 THER/PROPH/DIAG IV INF INIT: CPT

## 2024-10-02 PROCEDURE — 250N000011 HC RX IP 250 OP 636: Mod: JZ | Performed by: INTERNAL MEDICINE

## 2024-10-02 RX ORDER — METHYLPREDNISOLONE SODIUM SUCCINATE 125 MG/2ML
125 INJECTION INTRAMUSCULAR; INTRAVENOUS
Start: 2024-10-09

## 2024-10-02 RX ORDER — HEPARIN SODIUM (PORCINE) LOCK FLUSH IV SOLN 100 UNIT/ML 100 UNIT/ML
5 SOLUTION INTRAVENOUS
OUTPATIENT
Start: 2024-10-09

## 2024-10-02 RX ORDER — DIPHENHYDRAMINE HYDROCHLORIDE 50 MG/ML
50 INJECTION INTRAMUSCULAR; INTRAVENOUS
Start: 2024-10-09

## 2024-10-02 RX ORDER — EPINEPHRINE 1 MG/ML
0.3 INJECTION, SOLUTION INTRAMUSCULAR; SUBCUTANEOUS EVERY 5 MIN PRN
OUTPATIENT
Start: 2024-10-09

## 2024-10-02 RX ORDER — HEPARIN SODIUM,PORCINE 10 UNIT/ML
5-20 VIAL (ML) INTRAVENOUS DAILY PRN
OUTPATIENT
Start: 2024-10-09

## 2024-10-02 RX ORDER — MEPERIDINE HYDROCHLORIDE 25 MG/ML
25 INJECTION INTRAMUSCULAR; INTRAVENOUS; SUBCUTANEOUS EVERY 30 MIN PRN
OUTPATIENT
Start: 2024-10-09

## 2024-10-02 RX ORDER — ALBUTEROL SULFATE 90 UG/1
1-2 INHALANT RESPIRATORY (INHALATION)
Start: 2024-10-09

## 2024-10-02 RX ORDER — ALBUTEROL SULFATE 0.83 MG/ML
2.5 SOLUTION RESPIRATORY (INHALATION)
OUTPATIENT
Start: 2024-10-09

## 2024-10-02 RX ADMIN — SODIUM CHLORIDE 250 ML: 9 INJECTION, SOLUTION INTRAVENOUS at 12:45

## 2024-10-02 RX ADMIN — FERRIC CARBOXYMALTOSE INJECTION 750 MG: 50 INJECTION, SOLUTION INTRAVENOUS at 12:48

## 2024-10-02 ASSESSMENT — PAIN SCALES - GENERAL: PAINLEVEL: NO PAIN (0)

## 2024-10-02 NOTE — PROGRESS NOTES
Infusion Nursing Note:  Sage Machado presents today for NEW Injectafer.    Patient seen by provider today: No   present during visit today: Not Applicable.    Note: Pt new to infusion center. Pt oriented to infusion center and it's surroundings. Pt states she has had both IV and oral iron's in the past. Written and verbal education provided on today's infusion and pt in agreement on when to call out if signs of hypersensitivity reaction.      Intravenous Access:  Peripheral IV placed.    Treatment Conditions:  Not Applicable.      Post Infusion Assessment:  Patient tolerated infusion without incident.  Patient observed for 30 minutes post Injectafer per protocol.  Blood return noted pre and post infusion.  Site patent and intact, free from redness, edema or discomfort.  No evidence of extravasations.  Access discontinued per protocol.       Discharge Plan:   AVS to patient via MYCHART.  Patient will return in 1 week for next appointment. Future appts have been reviewed and crosschecked with appt note and plan.   Patient discharged in stable condition accompanied by: self.  Departure Mode: Ambulatory.      Sindhu Gonzalez RN

## 2025-04-19 ENCOUNTER — HEALTH MAINTENANCE LETTER (OUTPATIENT)
Age: 30
End: 2025-04-19